# Patient Record
Sex: FEMALE | Race: WHITE | Employment: OTHER | URBAN - METROPOLITAN AREA
[De-identification: names, ages, dates, MRNs, and addresses within clinical notes are randomized per-mention and may not be internally consistent; named-entity substitution may affect disease eponyms.]

---

## 2022-11-17 ENCOUNTER — APPOINTMENT (OUTPATIENT)
Dept: CT IMAGING | Age: 72
End: 2022-11-17
Attending: EMERGENCY MEDICINE
Payer: MEDICARE

## 2022-11-17 ENCOUNTER — HOSPITAL ENCOUNTER (EMERGENCY)
Age: 72
Discharge: HOME | End: 2022-11-17
Attending: EMERGENCY MEDICINE
Payer: MEDICARE

## 2022-11-17 ENCOUNTER — APPOINTMENT (OUTPATIENT)
Dept: GENERAL RADIOLOGY | Age: 72
End: 2022-11-17
Attending: EMERGENCY MEDICINE
Payer: MEDICARE

## 2022-11-17 VITALS
HEART RATE: 63 BPM | SYSTOLIC BLOOD PRESSURE: 138 MMHG | DIASTOLIC BLOOD PRESSURE: 68 MMHG | TEMPERATURE: 97.9 F | HEIGHT: 63 IN | BODY MASS INDEX: 25.69 KG/M2 | OXYGEN SATURATION: 97 % | WEIGHT: 145 LBS | RESPIRATION RATE: 18 BRPM
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
## 2022-11-17 DIAGNOSIS — S00.83XA TRAUMATIC ECCHYMOSIS OF FACE, INITIAL ENCOUNTER: ICD-10-CM

## 2022-11-17 DIAGNOSIS — S63.502A WRIST SPRAIN, LEFT, INITIAL ENCOUNTER: ICD-10-CM

## 2022-11-17 DIAGNOSIS — S09.90XA CLOSED HEAD INJURY, INITIAL ENCOUNTER: ICD-10-CM

## 2022-11-17 PROCEDURE — 70486 CT MAXILLOFACIAL W/O DYE: CPT

## 2022-11-17 PROCEDURE — 73130 X-RAY EXAM OF HAND: CPT

## 2022-11-17 PROCEDURE — 73110 X-RAY EXAM OF WRIST: CPT

## 2022-11-17 PROCEDURE — 99284 EMERGENCY DEPT VISIT MOD MDM: CPT

## 2022-11-17 PROCEDURE — 73080 X-RAY EXAM OF ELBOW: CPT

## 2022-11-17 PROCEDURE — 70450 CT HEAD/BRAIN W/O DYE: CPT

## 2022-11-17 PROCEDURE — 74011250637 HC RX REV CODE- 250/637: Performed by: EMERGENCY MEDICINE

## 2022-11-17 RX ORDER — NAPROXEN 500 MG/1
500 TABLET ORAL
Qty: 30 TABLET | Refills: 0 | Status: SHIPPED | OUTPATIENT
Start: 2022-11-17

## 2022-11-17 RX ORDER — HYDROCODONE BITARTRATE AND ACETAMINOPHEN 5; 325 MG/1; MG/1
1 TABLET ORAL
Qty: 5 TABLET | Refills: 0 | Status: SHIPPED | OUTPATIENT
Start: 2022-11-17 | End: 2022-11-20

## 2022-11-17 RX ORDER — IBUPROFEN 400 MG/1
400 TABLET ORAL
Status: COMPLETED | OUTPATIENT
Start: 2022-11-17 | End: 2022-11-17

## 2022-11-17 RX ADMIN — IBUPROFEN 400 MG: 400 TABLET, FILM COATED ORAL at 12:04

## 2022-11-17 NOTE — DISCHARGE INSTRUCTIONS
You were tender over the wrist and so splint was placed for the possibility of a scaphoid injury. Is important that you wear the splint until seen by orthopedics and they will repeat an x-ray to verify no fracture is seen.

## 2022-11-17 NOTE — ED TRIAGE NOTES
Patient comes to the ER for left wrist and left arm pain after a fall yesterday. Patient states she tripped in her driveway. Patient states that she did hit her head, denies any LOC. Patient denies blood thinners, shortness of breath.

## 2023-09-11 NOTE — ED PROVIDER NOTES
Mom informed  Treat with Cefdinir   and prednisone  Give albuterol  2 puffs every  4-6 hours   Give allegra  10 ml twice  a day  Await Respiratory  Viral PCR    Patient is a pleasant 25-year-old female who presents emergency department after mechanical trip and fall yesterday. She states she may have passed out briefly. Also noted left-sided facial pain and bruising. Denies any laceration but states that she has had significant pain of the left arm and has not been able to use it much today. She states she did drive herself here as her  does not drive after having had a stroke. No neck pain or back pain, no chest pain or shortness of breath, no lower extremity discomfort or decreased range of motion. No past medical history on file. No past surgical history on file. No family history on file. Social History     Socioeconomic History    Marital status:      Spouse name: Not on file    Number of children: Not on file    Years of education: Not on file    Highest education level: Not on file   Occupational History    Not on file   Tobacco Use    Smoking status: Not on file    Smokeless tobacco: Not on file   Substance and Sexual Activity    Alcohol use: Not on file    Drug use: Not on file    Sexual activity: Not on file   Other Topics Concern    Not on file   Social History Narrative    Not on file     Social Determinants of Health     Financial Resource Strain: Not on file   Food Insecurity: Not on file   Transportation Needs: Not on file   Physical Activity: Not on file   Stress: Not on file   Social Connections: Not on file   Intimate Partner Violence: Not on file   Housing Stability: Not on file         ALLERGIES: Patient has no known allergies. Review of Systems   Constitutional:  Negative for chills and fever. HENT:  Negative for drooling. Eyes:  Negative for visual disturbance. Respiratory:  Negative for shortness of breath. Cardiovascular:  Negative for chest pain. Genitourinary:  Negative for dysuria. Musculoskeletal:  Positive for arthralgias and joint swelling.  Negative for back pain, neck pain and neck stiffness. Skin:  Negative for rash. Neurological:  Positive for headaches (after Fall). Negative for seizures, syncope and facial asymmetry. Psychiatric/Behavioral: Negative. Vitals:    11/17/22 1127   BP: 138/68   Pulse: 63   Resp: 18   Temp: 97.9 °F (36.6 °C)   SpO2: 97%   Weight: 65.8 kg (145 lb)   Height: 5' 3\" (1.6 m)            Physical Exam  Vitals and nursing note reviewed. Constitutional:       General: She is in acute distress. Appearance: She is not toxic-appearing or diaphoretic. HENT:      Head: No raccoon eyes or Elias's sign. Jaw: There is normal jaw occlusion. No pain on movement or malocclusion. Right Ear: External ear normal.      Left Ear: External ear normal.      Nose: Nose normal. No nasal deformity. Right Nostril: No epistaxis. Left Nostril: No epistaxis. Eyes:      Extraocular Movements: Extraocular movements intact. Conjunctiva/sclera: Conjunctivae normal.      Pupils: Pupils are equal, round, and reactive to light. Neck:      Trachea: Trachea and phonation normal.   Cardiovascular:      Rate and Rhythm: Normal rate. Pulses: Normal pulses. Heart sounds: No friction rub. Pulmonary:      Effort: Pulmonary effort is normal.      Breath sounds: Normal breath sounds. Chest:      Chest wall: No tenderness. Abdominal:      Palpations: Abdomen is soft. Tenderness: There is no guarding. Musculoskeletal:      Right shoulder: Normal. No deformity. Left shoulder: No deformity or bony tenderness. Normal range of motion. Right upper arm: Normal.      Left upper arm: No bony tenderness. Right elbow: No deformity. Normal range of motion. No tenderness. Left elbow: No swelling, deformity or lacerations. Decreased range of motion. Tenderness present. Right forearm: No deformity, lacerations, tenderness or bony tenderness. Left forearm: Tenderness present. No deformity.       Right wrist: No deformity, bony tenderness or snuff box tenderness. Normal range of motion. Normal pulse. Left wrist: Swelling, tenderness, bony tenderness and snuff box tenderness present. No deformity, lacerations or crepitus. Decreased range of motion. Normal pulse. Right hand: No swelling or deformity. Normal range of motion. Left hand: Swelling present. Normal pulse. Hands:       Cervical back: Neck supple. No rigidity or tenderness. No spinous process tenderness. Right lower leg: No edema. Left lower leg: No edema. Skin:     General: Skin is warm and dry. Neurological:      Mental Status: She is alert and oriented to person, place, and time.    Psychiatric:         Mood and Affect: Mood normal.         Behavior: Behavior normal.        MDM         APPLY THUMB GUTTER SPLINT    Date/Time: 11/17/2022 1:29 PM  Performed by: Suzie Verdin MD  Authorized by: Suzie Verdin MD     Consent:     Consent obtained:  Verbal    Consent given by:  Patient    Risks, benefits, and alternatives were discussed: yes      Risks discussed:  Discoloration, numbness, swelling and pain    Alternatives discussed:  No treatment and alternative treatment  Universal protocol:     Procedure explained and questions answered to patient or proxy's satisfaction: yes      Relevant documents present and verified: yes      Imaging studies available: yes      Patient identity confirmed:  Verbally with patient  Pre-procedure details:     Distal neurologic exam:  Normal    Distal perfusion: distal pulses strong    Procedure details:     Location:  Wrist    Wrist location:  L wrist    Cast type:  Short arm    Splint type:  Thumb spica    Supplies:  Cotton padding, fiberglass and sling  Post-procedure details:     Distal neurologic exam:  Normal    Distal perfusion: brisk capillary refill      Procedure completion:  Tolerated well, no immediate complications    Post-procedure imaging: not applicable